# Patient Record
Sex: FEMALE | Race: WHITE
[De-identification: names, ages, dates, MRNs, and addresses within clinical notes are randomized per-mention and may not be internally consistent; named-entity substitution may affect disease eponyms.]

---

## 2020-02-16 ENCOUNTER — HOSPITAL ENCOUNTER (OUTPATIENT)
Dept: HOSPITAL 76 - DI | Age: 25
Discharge: HOME | End: 2020-02-16
Attending: OBSTETRICS & GYNECOLOGY
Payer: COMMERCIAL

## 2020-02-16 DIAGNOSIS — Z32.01: Primary | ICD-10-CM

## 2020-02-16 PROCEDURE — 76801 OB US < 14 WKS SINGLE FETUS: CPT

## 2020-02-16 NOTE — ULTRASOUND REPORT
Reason:  POSITIVE PREGNANCY TEST

Procedure Date:  02/16/2020   

Accession Number:  [id number] / [id number]                    

Procedure:  US  - OB First Trimester CPT Code:  

 

***Final Report***

 

 

FULL RESULT:

 

 

EXAM:

FIRST TRIMESTER OBSTETRIC ULTRASOUND

(Less than 11 weeks)

 

EXAM DATE: 2/16/2020 09:31 AM.

 

CLINICAL HISTORY: Positive pregnancy test.

LMP: 11/16/2019.

 

COMPARISONS: None.

 

TECHNIQUE: Transabdominal and transvaginal ultrasound examination with 

static image documentation.

 

CLINICAL DATES:

EGA 13 weeks 1 day with KONRAD 08/22/2020 based on LMP.

 

ASSESSMENT:

Gestational Sac: Single intrauterine.  Mean gestational sac diameter: 71 

mm = 13 weeks 5 days.

Embryo: CRL (crown-rump length) 73 mm = 13 weeks 3 days.

Cardiac activity: 161 beats per minute.

Yolk sac: Not visualized.

Amniotic fluid: JANAY 7.2 cm, MVP equals 3.3 cm.

Early placenta: Posterior fundal.

Other: No perigestational fluid collection demonstrated.

 

MATERNAL STRUCTURES:

Uterus: Anteverted. Unremarkable.

Cervix: Closed.

Right Ovary/Adnexa: The ovary measures 3.1 x 1.7 x 2.8 cm, volume 7.8 cc. 

No masses. Corpus luteum is present measuring 1.7 x 1.4 x 1.4 cm.

Left Ovary/Adnexa: The ovary measures 1.9 x 1.0 x 2.2 cm, volume 2.1 cc.  

Unremarkable.

Free Fluid: None.

 

Other: None.

IMPRESSION:

1. Single viable intrauterine pregnancy at EGA 13 weeks 3 days with KONRAD 

08/20/2020 based on crown-rump length, which is concordant with clinical 

dates.

2. Assigned dating is KONRAD 08/17/2020 based on current ultrasound.

 

RADIA

## 2020-02-25 ENCOUNTER — HOSPITAL ENCOUNTER (OUTPATIENT)
Dept: HOSPITAL 76 - LAB.R | Age: 25
Discharge: HOME | End: 2020-02-25
Attending: OBSTETRICS & GYNECOLOGY
Payer: COMMERCIAL

## 2020-02-25 DIAGNOSIS — Z36.89: Primary | ICD-10-CM

## 2020-02-25 LAB
AMPHET UR QL SCN: NEGATIVE
BENZODIAZ UR QL SCN: NEGATIVE
CLARITY UR REFRACT.AUTO: CLEAR
COCAINE UR-SCNC: NEGATIVE UMOL/L
GLUCOSE UR QL STRIP.AUTO: NEGATIVE MG/DL
KETONES UR QL STRIP.AUTO: NEGATIVE MG/DL
METHADONE UR QL SCN: NEGATIVE
METHAMPHET UR QL SCN: NEGATIVE
NITRITE UR QL STRIP.AUTO: NEGATIVE
OPIATES UR QL SCN: NEGATIVE
PH UR STRIP.AUTO: 6 PH (ref 5–7.5)
PROT UR STRIP.AUTO-MCNC: NEGATIVE MG/DL
RBC # UR STRIP.AUTO: NEGATIVE /UL
SP GR UR STRIP.AUTO: 1.02 (ref 1–1.03)
UROBILINOGEN UR QL STRIP.AUTO: (no result) E.U./DL
UROBILINOGEN UR STRIP.AUTO-MCNC: NEGATIVE MG/DL
VOLATILE DRUGS POS SERPL SCN: (no result)

## 2020-02-25 PROCEDURE — 87086 URINE CULTURE/COLONY COUNT: CPT

## 2020-02-25 PROCEDURE — 81001 URINALYSIS AUTO W/SCOPE: CPT

## 2020-02-25 PROCEDURE — 81003 URINALYSIS AUTO W/O SCOPE: CPT

## 2020-02-25 PROCEDURE — 80306 DRUG TEST PRSMV INSTRMNT: CPT

## 2020-03-31 ENCOUNTER — HOSPITAL ENCOUNTER (OUTPATIENT)
Dept: HOSPITAL 76 - LAB | Age: 25
Discharge: HOME | End: 2020-03-31
Attending: OBSTETRICS & GYNECOLOGY
Payer: COMMERCIAL

## 2020-03-31 ENCOUNTER — HOSPITAL ENCOUNTER (OUTPATIENT)
Dept: HOSPITAL 76 - DI | Age: 25
Discharge: HOME | End: 2020-03-31
Attending: OBSTETRICS & GYNECOLOGY
Payer: COMMERCIAL

## 2020-03-31 DIAGNOSIS — Z3A.20: ICD-10-CM

## 2020-03-31 DIAGNOSIS — Z34.80: Primary | ICD-10-CM

## 2020-03-31 DIAGNOSIS — Z36.0: ICD-10-CM

## 2020-03-31 DIAGNOSIS — Z36.89: ICD-10-CM

## 2020-03-31 DIAGNOSIS — O26.892: ICD-10-CM

## 2020-03-31 DIAGNOSIS — Z36.89: Primary | ICD-10-CM

## 2020-03-31 LAB
BASOPHILS NFR BLD AUTO: 0 10^3/UL (ref 0–0.1)
BASOPHILS NFR BLD AUTO: 0.2 %
EOSINOPHIL # BLD AUTO: 0 10^3/UL (ref 0–0.7)
EOSINOPHIL NFR BLD AUTO: 0.4 %
ERYTHROCYTE [DISTWIDTH] IN BLOOD BY AUTOMATED COUNT: 13.8 % (ref 12–15)
HGB UR QL STRIP: 10 G/DL (ref 12–16)
LYMPHOCYTES # SPEC AUTO: 1.2 10^3/UL (ref 1.5–3.5)
LYMPHOCYTES NFR BLD AUTO: 14.9 %
MCH RBC QN AUTO: 27 PG (ref 27–31)
MCHC RBC AUTO-ENTMCNC: 32.3 G/DL (ref 32–36)
MCV RBC AUTO: 83.6 FL (ref 81–99)
MONOCYTES # BLD AUTO: 0.5 10^3/UL (ref 0–1)
MONOCYTES NFR BLD AUTO: 6.6 %
NEUTROPHILS # BLD AUTO: 6.4 10^3/UL (ref 1.5–6.6)
NEUTROPHILS # SNV AUTO: 8.2 X10^3/UL (ref 4.8–10.8)
NEUTROPHILS NFR BLD AUTO: 77.5 %
PDW BLD AUTO: 10.4 FL (ref 7.9–10.8)
PLATELET # BLD: 250 10^3/UL (ref 130–450)
RBC MAR: 3.71 10^6/UL (ref 4.2–5.4)

## 2020-03-31 PROCEDURE — 81599 UNLISTED MAAA: CPT

## 2020-03-31 PROCEDURE — 86803 HEPATITIS C AB TEST: CPT

## 2020-03-31 PROCEDURE — 76811 OB US DETAILED SNGL FETUS: CPT

## 2020-03-31 PROCEDURE — 36415 COLL VENOUS BLD VENIPUNCTURE: CPT

## 2020-03-31 PROCEDURE — 86900 BLOOD TYPING SEROLOGIC ABO: CPT

## 2020-03-31 PROCEDURE — 85025 COMPLETE CBC W/AUTO DIFF WBC: CPT

## 2020-03-31 PROCEDURE — 86850 RBC ANTIBODY SCREEN: CPT

## 2020-03-31 PROCEDURE — 87389 HIV-1 AG W/HIV-1&-2 AB AG IA: CPT

## 2020-03-31 PROCEDURE — 86901 BLOOD TYPING SEROLOGIC RH(D): CPT

## 2020-03-31 PROCEDURE — 87340 HEPATITIS B SURFACE AG IA: CPT

## 2020-03-31 PROCEDURE — 86762 RUBELLA ANTIBODY: CPT

## 2020-04-01 LAB
HEPATITIS B SURFACE ANTIGEN: (no result)
HEPATITIS C ANTIBODY: (no result)
HIV AG/AB 4TH GEN: (no result)
SIGNAL TO CUT-OFF: 0 (ref ?–1)

## 2020-04-01 NOTE — ULTRASOUND REPORT
Reason:  

Procedure Date:  2020   

Accession Number:  [id number] / [id number]                    

Procedure:  US  - OB Detailed Fetal Eval CPT Code:  

 

***Final Report***

 

 

FULL RESULT:

 

 

EXAM:

COMPLETE OBSTETRICAL ULTRASOUND

 

EXAM DATE: 3/31/2020 12:53 PM.

 

CLINICAL HISTORY: Fetal anatomic survey.

 

COMPARISON: OB FIRST TRIMESTER 2020 8:37 AM.

 

TECHNIQUE: Real-time sonographic evaluation of the fetus performed by the 

sonographer. Multiple representative static images were saved for review. 

Additional transvaginal imaging to more accurately evaluate cervical 

length/placental position/etc.

 

DATING:

Established EGA 20 weeks 1 day with KONRAD 2020 based on stated dates.

EGA 19 weeks 3 days with KONRAD 2020 based on LMP.

EGA 20 weeks 4 days with KONRAD 2020 based on the current ultrasound.

 

GENERAL EVALUATION

Miranda pregnancy.

Cardiac activity: 144 bpm.

Fetal movement: Visualized.

Presentation: Variable.

Placenta: Fundal position. No evidence for previa.

Umbilical cord: 3 vessel cord. Central placental cord origin.

Amniotic fluid: Subjectively normal. MVP 4.8 cm.

 

FETAL BIOMETRY

Bi-Parietal Diameter (BPD): 5.0 cm, 21 weeks 1 day

Head Circumference (HC):   17.9 cm, 20 weeks 3 days

Abdominal Circumference (AC): 15.7 cm, 20 weeks 6 days

Femur Length (FL): 3.1 cm, 19 weeks 5 days

 

Estimated Fetal Weight: 347 g, 56.3 percentile for 20 weeks 1 day.

 

FETAL ANATOMY

The intracranial structures, profile, face/nose/lips, spine, 4 chamber 

heart and outflow tracts, stomach, abdominal wall and cord insertion, 

diaphragm, bladder, and extremities were visualized and demonstrate no 

abnormality. Fetal renal pelviectasis is noted measuring 5.4 mm on the 

right and 3.5 mm on the left.

 

MATERNAL STRUCTURES

Uterus: Unremarkable.

Cervix: Long and closed. Transabdominal length 5.9 cm.

Right ovary/adnexa: Unremarkable.

Left ovary/adnexa: Unremarkable.

Free fluid: None.

IMPRESSION:

1. Miranda live intrauterine pregnancy with gestational age 20 weeks 1 

day based on stated dates.

2. Estimated fetal weight is within expected limits for assigned dating.

3. Fetal renal pelviectasis measuring 5.4 mm on the right and 3.5 mm on 

the left. Otherwise no fetal anatomic abnormalities are detected at this 

time.

 

RADIA

## 2020-04-11 ENCOUNTER — HOSPITAL ENCOUNTER (OUTPATIENT)
Dept: HOSPITAL 76 - WFO | Age: 25
Discharge: HOME | End: 2020-04-11
Attending: OBSTETRICS & GYNECOLOGY
Payer: COMMERCIAL

## 2020-04-11 VITALS — DIASTOLIC BLOOD PRESSURE: 74 MMHG | SYSTOLIC BLOOD PRESSURE: 117 MMHG

## 2020-04-11 DIAGNOSIS — Z3A.21: ICD-10-CM

## 2020-04-11 DIAGNOSIS — Z67.21: ICD-10-CM

## 2020-04-11 DIAGNOSIS — O26.892: ICD-10-CM

## 2020-04-11 DIAGNOSIS — N94.89: ICD-10-CM

## 2020-04-11 DIAGNOSIS — O99.89: Primary | ICD-10-CM

## 2020-04-11 LAB
CLARITY UR REFRACT.AUTO: CLEAR
GLUCOSE UR QL STRIP.AUTO: NEGATIVE MG/DL
IGF BP1 VAG QL: NEGATIVE
KETONES UR QL STRIP.AUTO: NEGATIVE MG/DL
NITRITE UR QL STRIP.AUTO: NEGATIVE
PH UR STRIP.AUTO: 7 PH (ref 5–7.5)
PROT UR STRIP.AUTO-MCNC: NEGATIVE MG/DL
RBC # UR STRIP.AUTO: NEGATIVE /UL
SP GR UR STRIP.AUTO: <=1.005 (ref 1–1.03)
SQUAMOUS URNS QL MICRO: (no result)
UROBILINOGEN UR QL STRIP.AUTO: (no result) E.U./DL
UROBILINOGEN UR STRIP.AUTO-MCNC: NEGATIVE MG/DL

## 2020-04-11 PROCEDURE — 84112 EVAL AMNIOTIC FLUID PROTEIN: CPT

## 2020-04-11 PROCEDURE — 81001 URINALYSIS AUTO W/SCOPE: CPT

## 2020-04-11 PROCEDURE — 87086 URINE CULTURE/COLONY COUNT: CPT

## 2020-04-11 PROCEDURE — 76815 OB US LIMITED FETUS(S): CPT

## 2020-04-11 PROCEDURE — 99214 OFFICE O/P EST MOD 30 MIN: CPT

## 2020-04-11 PROCEDURE — 96372 THER/PROPH/DIAG INJ SC/IM: CPT

## 2020-04-11 NOTE — ULTRASOUND REPORT
Reason:  abdominal cramping, Rh negative

Procedure Date:  04/11/2020   

Accession Number:  [id number] / [id number]                    

Procedure:  US  - OB Limited CPT Code:  

 

***Addended Final Report***

 

 

FULL RESULT:

 

 

EXAM:

LIMITED OBSTETRICAL ULTRASOUND

 

EXAM DATE: 4/11/2020 02:54 PM.

 

CLINICAL HISTORY: Abdominal cramping, Rh negative.

 

COMPARISON: None.

 

TECHNIQUE: Limited Real-time sonographic evaluation of the fetus 

performed by the sonographer. Multiple representative static images were 

saved for review.

 

DATING:

Established EGA 21 weeks 0 days with KONRAD 08/22/2020.

 

GENERAL EVALUATION

Miranda pregnancy.

Cardiac activity: 144 bpm.

Fetal movement: Visualized.

Presentation: Variable. Facing maternal left.

Placenta: Fundal position.

Amniotic fluid: JANAY 16.6 cm. MVP 4.3 cm.

 

 

MATERNAL STRUCTURES

Maternal cervix long and closed measuring 4.7 cm in length.

 

IMPRESSION:

1. Miranda live intrauterine pregnancy with gestational age 21 weeks 0 

days based on prior ultrasounds..

2. Maternal cervix closed measuring 4.7 cm in length.

3. No placental previa or placental abruption.

4. Fetal heart rate of 144

 

RADIA

The call report notification system was initiated by Dr. Collins Del Rosario at 

05:23 PM on 4/11/2020.

ADDENDUM: 04/11/20 17:35

 

The above call report findings  were discussed with Dr. Russo by Dr. Collins Del Rosario at 05:35 PM on 4/11/2020.

## 2020-04-11 NOTE — PREOP HISTORY & PHYSICAL
DATE OF SERVICE: 2020

Physician: Facundo Russo MD

 

IDENTIFICATION:  The patient is a 25-year-old G2, P1 female who is 21 weeks determined by LMP as well
 as early ultrasound.

 

CHIEF COMPLAINT:  Uterine cramping.

 

HISTORY OF PRESENT ILLNESS:  The patient was awakened this morning with cramping.  This comes and goe
s.  She states that it goes to a maximum of 8 to 9/10.  She has a baseline of 3 to 4/10.  She denies 
any bleeding accompanying this.  She denies any history of trauma.  She states it is improved with si
tting but worse with standing and walking around.  She denies any recent sex or any other potential a
ggravating factors.  She currently chases a 9-year-old around the apartment.

 

PAST MEDICAL HISTORY:  Positive for rheumatoid arthritis.

 

PAST SURGICAL HISTORY:  Positive for dental surgery.

 

ALLERGIES:  

1.  PENICILLIN.

2.  BENADRYL.

 

CURRENT MEDICATIONS

1.  Prenatal vitamins.

2.  Zofran.

 

HABITS/SOCIAL: The patient denies the use of alcohol, tobacco, street or addictive drugs or tetrahydr
ocannabinol. The patient is  to an [employer] person.

 

PHYSICAL EXAMINATION:  

GENERAL:  Well-developed, well-nourished white female.  She is in no acute distress at this time.

VITAL SIGNS:  Temperature is 37, heart rate 82, blood pressure 117/84, respirations 18, saturations 1
00% on room air.

HEENT:  The left pupil was roughly 1 mm larger than the right pupil. Extraocular muscles are intact.

CARDIOVASCULAR:  Heart is regular rate and rhythm without murmurs.

LUNGS:  Lung fields are clear without rales or wheezes.

ABDOMEN:  Gravid, roughly 28 cm fundal height.  

GENITOURINARY:  The uterus is minimally tender at this time.  Speculum examination shows no evidence 
of fluid from the vagina.  A ROM-Plus was taken. The cervix was noted to be closed. Presenting part o
f the infant is high in the pelvis.  She has previously had an ultrasound, which is unremarkable, con
current with her dates.  Placenta was noted to be not previa at that time.  However, the exact locati
on was not mentioned.

 

LABORATORY DATA:  Urinalysis performed, which was negative for nitrites, leukocytes, squamous epithel
ial cells.  WBCs were 0-3.  The patient's lab shows her to be B-negative.

 

IMPRESSION:  A 26-year-old  2, para 1 female at 21 weeks with uterine cramping of unknown etio
logy.  She is Rh-negative.

 

PLAN:  At this point, we will obtain ultrasound looking for evidence of any retroplacental bleeding a
t this time. We will also administer RhoGAM at this time for the possibility of undiagnosed uterine b
leed.

 

 

DD: 2020 14:27

TD: 2020 14:29

Job #: [id number]

## 2020-05-11 ENCOUNTER — HOSPITAL ENCOUNTER (OUTPATIENT)
Dept: HOSPITAL 76 - LAB.WCP | Age: 25
Discharge: HOME | End: 2020-05-11
Attending: OBSTETRICS & GYNECOLOGY
Payer: COMMERCIAL

## 2020-05-11 DIAGNOSIS — R32: ICD-10-CM

## 2020-05-11 DIAGNOSIS — O09.899: Primary | ICD-10-CM

## 2020-05-11 DIAGNOSIS — Z3A.00: ICD-10-CM

## 2020-05-11 PROCEDURE — 87086 URINE CULTURE/COLONY COUNT: CPT

## 2020-05-26 ENCOUNTER — HOSPITAL ENCOUNTER (OUTPATIENT)
Dept: HOSPITAL 76 - LAB | Age: 25
Discharge: HOME | End: 2020-05-26
Attending: OBSTETRICS & GYNECOLOGY
Payer: COMMERCIAL

## 2020-05-26 ENCOUNTER — HOSPITAL ENCOUNTER (OUTPATIENT)
Dept: HOSPITAL 76 - DI | Age: 25
Discharge: HOME | End: 2020-05-26
Attending: OBSTETRICS & GYNECOLOGY
Payer: COMMERCIAL

## 2020-05-26 DIAGNOSIS — Z67.91: ICD-10-CM

## 2020-05-26 DIAGNOSIS — O99.019: Primary | ICD-10-CM

## 2020-05-26 LAB
ERYTHROCYTE [DISTWIDTH] IN BLOOD BY AUTOMATED COUNT: 14.1 % (ref 12–15)
HGB UR QL STRIP: 9.1 G/DL (ref 12–16)
MCH RBC QN AUTO: 24.7 PG (ref 27–31)
MCHC RBC AUTO-ENTMCNC: 30.6 G/DL (ref 32–36)
MCV RBC AUTO: 80.7 FL (ref 81–99)
NEUTROPHILS # SNV AUTO: 8.2 X10^3/UL (ref 4.8–10.8)
PDW BLD AUTO: 10.5 FL (ref 7.9–10.8)
PLATELET # BLD: 223 10^3/UL (ref 130–450)
RBC MAR: 3.68 10^6/UL (ref 4.2–5.4)

## 2020-05-26 PROCEDURE — 82950 GLUCOSE TEST: CPT

## 2020-05-26 PROCEDURE — 85027 COMPLETE CBC AUTOMATED: CPT

## 2020-05-26 PROCEDURE — 86850 RBC ANTIBODY SCREEN: CPT

## 2020-05-26 PROCEDURE — 86870 RBC ANTIBODY IDENTIFICATION: CPT

## 2020-05-26 PROCEDURE — 76816 OB US FOLLOW-UP PER FETUS: CPT

## 2020-05-26 PROCEDURE — 36415 COLL VENOUS BLD VENIPUNCTURE: CPT

## 2020-05-27 NOTE — ULTRASOUND REPORT
Reason:  FETAL ABNORMALITY IN ANTEPARTUM PREG

Procedure Date:  05/26/2020   

Accession Number:  [id number] / [id number]                    

Procedure:  US  - OB F/U or Repeat CPT Code:  

 

***Final Report***

 

 

FULL RESULT:

 

 

EXAM:

LIMITED OBSTETRICAL ULTRASOUND

 

EXAM DATE: 5/26/2020 08:58 AM.

 

CLINICAL HISTORY: FETAL ABNORMALITY IN ANTEPARTUM PREG. Follow-up fetal 

renal pelviectasis

 

COMPARISON: 03/31/2020.

 

TECHNIQUE: Real-time sonographic evaluation of the fetus performed by the 

sonographer. Multiple representative static images were saved for review. 

Additional transvaginal imaging to more accurately evaluate cervical 

length/placental position/etc.

 

DATING:

Established EGA 28 weeks 1 day with KONRAD 08/17/2020. Provider stated

 

GENERAL EVALUATION

Miranda pregnancy.

Cardiac activity: 143 bpm.

Fetal movement: Visualized.

Presentation: Transverse head maternal right

Placenta: Bundle position.

Amniotic fluid: Normal. JANAY 16.4 cm. MVP 6.2 cm.

 

FETAL ANATOMY

Fetal renal pelvis right 0.34, left 0.45 cm. These are within normal 

limits for 28 weeks 1 day

 

 

IMPRESSION:

1. Miranda live intrauterine pregnancy with gestational age 28 weeks 1 

day based on established KONRAD of 08/17/2020.

2. Fetal renal pelvis are now within normal limits.

 

RADIA

## 2020-06-08 ENCOUNTER — HOSPITAL ENCOUNTER (OUTPATIENT)
Dept: HOSPITAL 76 - WFO | Age: 25
Discharge: HOME | End: 2020-06-08
Attending: OBSTETRICS & GYNECOLOGY
Payer: COMMERCIAL

## 2020-06-08 VITALS — SYSTOLIC BLOOD PRESSURE: 113 MMHG | DIASTOLIC BLOOD PRESSURE: 67 MMHG

## 2020-06-08 DIAGNOSIS — D64.9: ICD-10-CM

## 2020-06-08 DIAGNOSIS — O99.013: Primary | ICD-10-CM

## 2020-06-08 DIAGNOSIS — Z3A.30: ICD-10-CM

## 2020-06-08 PROCEDURE — 99213 OFFICE O/P EST LOW 20 MIN: CPT

## 2020-06-08 PROCEDURE — 96365 THER/PROPH/DIAG IV INF INIT: CPT

## 2020-06-22 ENCOUNTER — HOSPITAL ENCOUNTER (OUTPATIENT)
Dept: HOSPITAL 76 - WFO | Age: 25
Discharge: HOME | End: 2020-06-22
Attending: OBSTETRICS & GYNECOLOGY
Payer: COMMERCIAL

## 2020-06-22 VITALS — DIASTOLIC BLOOD PRESSURE: 74 MMHG | SYSTOLIC BLOOD PRESSURE: 111 MMHG

## 2020-06-22 DIAGNOSIS — O26.893: Primary | ICD-10-CM

## 2020-06-22 DIAGNOSIS — Z3A.32: ICD-10-CM

## 2020-06-22 LAB
AMPHET UR QL SCN: NEGATIVE
BENZODIAZ UR QL SCN: NEGATIVE
CLARITY UR REFRACT.AUTO: CLEAR
COCAINE UR-SCNC: NEGATIVE UMOL/L
GLUCOSE UR QL STRIP.AUTO: NEGATIVE MG/DL
KETONES UR QL STRIP.AUTO: NEGATIVE MG/DL
METHADONE UR QL SCN: NEGATIVE
METHAMPHET UR QL SCN: NEGATIVE
NITRITE UR QL STRIP.AUTO: NEGATIVE
OPIATES UR QL SCN: NEGATIVE
PH UR STRIP.AUTO: 7 PH (ref 5–7.5)
PROT UR STRIP.AUTO-MCNC: NEGATIVE MG/DL
RBC # UR STRIP.AUTO: NEGATIVE /UL
SP GR UR STRIP.AUTO: 1.02 (ref 1–1.03)
T VAGINALIS RRNA GENITAL QL PROBE: NEGATIVE
UROBILINOGEN UR QL STRIP.AUTO: (no result) E.U./DL
UROBILINOGEN UR STRIP.AUTO-MCNC: NEGATIVE MG/DL
VOLATILE DRUGS POS SERPL SCN: (no result)

## 2020-06-22 PROCEDURE — 87081 CULTURE SCREEN ONLY: CPT

## 2020-06-22 PROCEDURE — 87661 TRICHOMONAS VAGINALIS AMPLIF: CPT

## 2020-06-22 PROCEDURE — 87591 N.GONORRHOEAE DNA AMP PROB: CPT

## 2020-06-22 PROCEDURE — 87086 URINE CULTURE/COLONY COUNT: CPT

## 2020-06-22 PROCEDURE — 76816 OB US FOLLOW-UP PER FETUS: CPT

## 2020-06-22 PROCEDURE — 99213 OFFICE O/P EST LOW 20 MIN: CPT

## 2020-06-22 PROCEDURE — 81003 URINALYSIS AUTO W/O SCOPE: CPT

## 2020-06-22 PROCEDURE — 81001 URINALYSIS AUTO W/SCOPE: CPT

## 2020-06-22 PROCEDURE — 87491 CHLMYD TRACH DNA AMP PROBE: CPT

## 2020-06-22 PROCEDURE — 80306 DRUG TEST PRSMV INSTRMNT: CPT

## 2020-06-22 PROCEDURE — 76817 TRANSVAGINAL US OBSTETRIC: CPT

## 2020-06-22 NOTE — ULTRASOUND REPORT
PROCEDURE:  OB Transvaginal

 

INDICATIONS:  vaginal bleeding

 

OUTSIDE/PRIOR DATING DATA:  

Last menstrual period (LMP): 11/16/2019.  

LMP-based estimated date of delivery (KONRAD): 8/22/2020.  

First dating scan (date and location): 2/16/2020.  

Estimated date of delivery (KONRAD) from first dating scan: 8/17/2020.  

 

TECHNIQUE:  Real-time scanning was performed of the fetus, with image documentation and biometric skip
surements.  Biophysical profile was also obtained.  

Endovaginal scanning:  yes

 

COMPARISON:  OB ultrasound 5/26/2020, 4/11/2020, 3/21/2020.

 

FINDINGS:  

 

General:  A single living intrauterine gestation is present.  

Presentation:  Transverse

Placenta:  Placental position is fundal, without previa.  

Amniotic fluid index:  15.0 cm, within normal limits for gestational age.  Largest pocket 4.8 cm

Fetal heart rate:  124 beats per minute. 

Maternal cervical canal:  4.9 cm long; normal length is 2.5 cm or more.  

 

Fetal biometrics:  

Biparietal diameter:  8.5 cm 34 weeks 2 days

Head circumference:  31.1 cm 34 weeks 6 days

Abdominal circumference:  29.5 cm 33 weeks 3 days

Femur length:  6.0 cm 31 weeks 2 days

Estimated gestational age from initial scan: 32 weeks 0 days

Composite gestational age from present scan:  33 weeks 3 days

Estimated fetal weight and percentile: 2107 g 73rd percentile

Measurement variability in biometric dating: +/- 10 days from 12-20 weeks gestation, +/- 2 weeks from
 20-30 weeks gestation, +/- 3 weeks at 30 weeks gestation or later.  

 

IMPRESSION:  

 

1. Single live intrauterine pregnancy with ultrasound gestational age 33 weeks 3 days compared to 32 
weeks 0 days. Ultrasound KONRAD is unchanged at 8/17/2020.

 

 

2. No acute abnormality.

 

Reviewed by: Karo Diaz MD on 6/22/2020 1:25 PM PDT

Approved by: Karo Diaz MD on 6/22/2020 1:25 PM PDT

 

 

Station ID:  [id number]

## 2020-06-22 NOTE — ULTRASOUND REPORT
PROCEDURE:  OB F/U or Repeat

 

INDICATIONS:  vaginal bleeding

 

OUTSIDE/PRIOR DATING DATA:  

Last menstrual period (LMP): 11/16/2019.  

LMP-based estimated date of delivery (KONRAD): 8/22/2020.  

First dating scan (date and location): 2/16/2020.  

Estimated date of delivery (KONRAD) from first dating scan: 8/17/2020.  

 

TECHNIQUE: 

Real-time scanning was performed of the fetus, with image documentation and biometric measurements.  


Endovaginal scanning:  Performed to evaluate cervical length.

 

COMPARISON:  5/26/2020

 

FINDINGS:  

 

General:  A single living intrauterine gestation is present.  

Presentation:  Transverse, head to the maternal right

Placenta:  Placental position is fundal, without previa.  

Amniotic fluid index:  15.0 cm, normal for gestational age.  Largest pocket is 4.8 cm

Fetal heart rate:  124 beats per minute.  

Maternal cervical canal:  4.9 cm long; normal length is 2.5 cm or more.  By transvaginal imaging, the
 cervix is closed.

 

Fetal biometrics:  

Biparietal diameter:  8.2 cm, 34 weeks, 2 days

Head circumference:  31.1 cm, 34 weeks, 6 days

Abdominal circumference:  29.5 cm, 33 weeks, 3 days

Femur length:  6.0 cm, 31 weeks, 2 days

Estimated gestational age from initial scan: 32 weeks, 0 days.  

Composite gestational age from present scan:  33 weeks, 3 days

Estimated fetal weight and percentile: 2107 g, 73rd percentile

Measurement variability in biometric dating: +/- 10 days from 12-20 weeks gestation, +/- 2 weeks from
 20-30 weeks gestation, +/- 3 weeks at 30 weeks gestation or more.  

 

IMPRESSION:  

 

1. Single live intrauterine pregnancy in transverse presentation.

2. Closed cervix measuring 4.9 cm in length.

3. Composite gestational age by today's study is 10 days ahead of the initially assigned gestational 
age.

 

Reviewed by: Michelle Crabtree MD on 6/22/2020 12:51 PM PDT

Approved by: Michelle Crabtree MD on 6/22/2020 12:51 PM PDT

 

 

Station ID:  [id number]

## 2020-06-24 NOTE — PROVIDER PROGRESS NOTE
- HPI


Chief Complaint: Other (Patientis 26 yo  at 32+2 wga reporting light VB.   

No recent IC. No CTX or LOF.)


Current Pregnancy: 


                                                               Current Pregnancy





EDU                              08/15/20


Gestation                        32 Weeks and 2 Days


                          2


Para                             1





Vital Signs











Temperature  98.4 F   20 11:18


 


Heart Rate  92   20 11:18


 


Respiratory Rate  16   20 11:18


 


Blood Pressure  115/74   20 11:18


 


O2 Saturation  100   20 11:18




















Temperature  98.4 F   20 11:18


 


Heart Rate  84   20 12:39


 


Respiratory Rate  18   20 12:39


 


Blood Pressure  111/74   20 12:39


 


O2 Saturation  100   20 12:39














- Procedures


OB Procedure Performed: NST


NST Procedure: 


 mod pam 15x15 accels no decels


TOCO: rare (<2/hr)


Service Date of procedure: 20


Procedure Details: 





CAt I tracing


Formal US showed TVCL 4.9 cm


Fundal placenta, no previa


Findings: 





Per RN exam: 


SSE: cervic long and visually closed, no blood in vaginal vault





TVCL 4.9 cm; no evidence of previa


FFN neg


GCCT and vaginitis panelneg


UA neg





TOCO with rarem intermittent ctx (1-2 per 30-60 minutes)














- Plan


Plan: 





FWB: 





CAT I tracing


EFW 73%ile


JANAY wnl








VB: 


CL reassuring, especially with later gestational age


FFN neg


-Low concern for  labor





-No evidence of previa on us


-No vaginitis or GCCT





Not actively bleeding





Warning sings reviewed


DC to home

## 2020-06-24 NOTE — PROVIDER PROGRESS NOTE
- HPI


Chief Complaint: Other (iron infusion)


Current Pregnancy: 


Vital Signs











Temperature  99.5 F   20 14:24


 


Heart Rate  84   20 14:24


 


Respiratory Rate  16   20 14:24


 


Blood Pressure  95/62   20 14:24


 


O2 Saturation  100   20 14:24




















Temperature  99.5 F   20 14:24


 


Heart Rate  80   20 15:50


 


Respiratory Rate  16   20 15:50


 


Blood Pressure  113/67   20 15:50


 


O2 Saturation  100   20 15:50

















Patient is a 26 yo  at 30+2 wga with HCT 29.7; failing oral supplementation


Here for IV iron infusion


No concerns.


+FM. Denies LOF/VB/CTX





- Procedures


NST Procedure: 


none


Service Date of procedure: 20


Procedure Details: 





Iron infusion given 





- Plan


Plan: 





26 yo  at 30+2 wga here for iron infusion for anemia affecting pregnancy


Iron infusion (Iron dextran, dose adjusted by pharmacy)  was administered 

without incident


Well tolerated





Patient to follow-up as per routine OB care

## 2020-07-29 ENCOUNTER — HOSPITAL ENCOUNTER (OUTPATIENT)
Dept: HOSPITAL 76 - LAB | Age: 25
Discharge: HOME | End: 2020-07-29
Attending: OBSTETRICS & GYNECOLOGY
Payer: COMMERCIAL

## 2020-07-29 DIAGNOSIS — O99.019: Primary | ICD-10-CM

## 2020-07-29 LAB
BASOPHILS NFR BLD AUTO: 0 10^3/UL (ref 0–0.1)
BASOPHILS NFR BLD AUTO: 0.3 %
EOSINOPHIL # BLD AUTO: 0 10^3/UL (ref 0–0.7)
EOSINOPHIL NFR BLD AUTO: 0.3 %
ERYTHROCYTE [DISTWIDTH] IN BLOOD BY AUTOMATED COUNT: 22.3 % (ref 12–15)
HGB UR QL STRIP: 12.6 G/DL (ref 12–16)
LYMPHOCYTES # SPEC AUTO: 1.4 10^3/UL (ref 1.5–3.5)
LYMPHOCYTES NFR BLD AUTO: 15.8 %
MCH RBC QN AUTO: 30.1 PG (ref 27–31)
MCHC RBC AUTO-ENTMCNC: 33.9 G/DL (ref 32–36)
MCV RBC AUTO: 88.8 FL (ref 81–99)
MONOCYTES # BLD AUTO: 0.8 10^3/UL (ref 0–1)
MONOCYTES NFR BLD AUTO: 8.6 %
NEUTROPHILS # BLD AUTO: 6.7 10^3/UL (ref 1.5–6.6)
NEUTROPHILS # SNV AUTO: 9 X10^3/UL (ref 4.8–10.8)
NEUTROPHILS NFR BLD AUTO: 74.6 %
PDW BLD AUTO: 10.4 FL (ref 7.9–10.8)
PLATELET # BLD: 206 10^3/UL (ref 130–450)
RBC MAR: 4.19 10^6/UL (ref 4.2–5.4)
RBC MORPH BLD: (no result)

## 2020-07-29 PROCEDURE — 85025 COMPLETE CBC W/AUTO DIFF WBC: CPT

## 2020-07-29 PROCEDURE — 36415 COLL VENOUS BLD VENIPUNCTURE: CPT

## 2020-08-04 ENCOUNTER — HOSPITAL ENCOUNTER (OUTPATIENT)
Dept: HOSPITAL 76 - WFO | Age: 25
Discharge: HOME | End: 2020-08-04
Attending: OBSTETRICS & GYNECOLOGY
Payer: COMMERCIAL

## 2020-08-04 VITALS — SYSTOLIC BLOOD PRESSURE: 119 MMHG | DIASTOLIC BLOOD PRESSURE: 78 MMHG

## 2020-08-04 DIAGNOSIS — Z34.83: Primary | ICD-10-CM

## 2020-08-04 DIAGNOSIS — Z3A.37: ICD-10-CM

## 2020-08-04 PROCEDURE — 99213 OFFICE O/P EST LOW 20 MIN: CPT

## 2020-08-04 NOTE — PROVIDER PROGRESS NOTE
- HPI


Chief Complaint: Labor Check


Current Pregnancy: 


                                                               Current Pregnancy





EDU                              20


Gestation                        37 Weeks and 3 Days


                          2


Para                             1





Vital Signs











Temperature  97.9 F   20 14:35


 


Heart Rate  107 H  20 14:35


 


Respiratory Rate  20   20 14:35


 


Blood Pressure  116/82 H  20 14:35


 


O2 Saturation  99   20 14:35




















Temperature  97.9 F   20 14:35


 


Heart Rate  89   20 17:11


 


Respiratory Rate  18   20 17:11


 


Blood Pressure  119/78   20 17:11


 


O2 Saturation  99   20 14:35














- Exam





SVE /-3


Unchanged over serial exams per RN





- Procedures


OB Procedure Performed: NST


NST Procedure: 


 mod pam 15x15 accels, isolated decel with > 30 min without decels


TOCO: intermittent


Service Date of procedure: 20


Procedure Details: 





Patient is a 26 yo  at 37+3 wga here for labor assessment





No change in SVE over several hours exam


Cat I tracing


Warning signs reviewed


DC to home

## 2020-08-07 ENCOUNTER — HOSPITAL ENCOUNTER (OUTPATIENT)
Dept: HOSPITAL 76 - WFO | Age: 25
Discharge: HOME | End: 2020-08-07
Attending: OBSTETRICS & GYNECOLOGY
Payer: COMMERCIAL

## 2020-08-07 VITALS — DIASTOLIC BLOOD PRESSURE: 74 MMHG | SYSTOLIC BLOOD PRESSURE: 108 MMHG

## 2020-08-07 DIAGNOSIS — Z3A.37: ICD-10-CM

## 2020-08-07 DIAGNOSIS — O36.8130: Primary | ICD-10-CM

## 2020-08-07 PROCEDURE — 59025 FETAL NON-STRESS TEST: CPT

## 2020-08-07 NOTE — PROVIDER PROGRESS NOTE
- HPI


Chief Complaint: Decreased fetal movement


Current Pregnancy: 


                                                               Current Pregnancy





EDU                              20


Gestation                        37 Weeks and 6 Days


                          2


Para                             1





Vital Signs











Temperature  98.4 F   20 09:31


 


Heart Rate  81   20 09:31


 


Respiratory Rate  18   20 09:31


 


Blood Pressure  108/74   20 09:31




















Temperature  98.4 F   20 09:31


 


Heart Rate  81   20 09:31


 


Respiratory Rate  18   20 09:31


 


Blood Pressure  108/74   20 09:31


 


O2 Saturation      














- Exam





 mod pam 15x15 accels no decels


TOCO: irritable





- Procedures


NST Procedure: 


NST Procedure





Start Date                       20


Start Time                       09:25


Stop Time                        10:10


Vibroacoustic Stimulation Used   No


Patient States Fetal Movement    Yes: decreased from normal pattern








Service Date of procedure: 20


Procedure Details: 





Cat I tracing


Warning signs reviewed 


Discharged to home

## 2020-08-12 ENCOUNTER — HOSPITAL ENCOUNTER (OUTPATIENT)
Dept: HOSPITAL 76 - WFO | Age: 25
Discharge: HOME | End: 2020-08-12
Attending: OBSTETRICS & GYNECOLOGY
Payer: COMMERCIAL

## 2020-08-12 VITALS — SYSTOLIC BLOOD PRESSURE: 115 MMHG | DIASTOLIC BLOOD PRESSURE: 80 MMHG

## 2020-08-12 DIAGNOSIS — Z34.83: Primary | ICD-10-CM

## 2020-08-12 PROCEDURE — 99213 OFFICE O/P EST LOW 20 MIN: CPT

## 2020-08-12 NOTE — PROVIDER PROGRESS NOTE
- HPI


Chief Complaint: Labor Check


Current Pregnancy: 


                                                               Current Pregnancy





EDU                              20


Gestation                        38 Weeks and 4 Days


                          2


Para                             1





Vital Signs











Temperature  37.1 C   20 14:31


 


Heart Rate  71   20 14:31


 


Respiratory Rate  18   20 14:31


 


Blood Pressure  115/80   20 14:31


 


O2 Saturation  100   20 14:31




















Temperature  37.1 C   20 14:31


 


Heart Rate  71   20 14:31


 


Respiratory Rate  18   20 14:31


 


Blood Pressure  115/80   20 14:31


 


O2 Saturation  100   20 14:31














- Procedures


OB Procedure Performed: NST


NST Procedure: 


NST Procedure





Start Time                       09:25


Stop Time                        10:10








Findings: 


reactive NST





cervix is not changed.





labor precautions








- Plan


Plan: 


return for labor, SROM, decreased FM.


Keep clinic appt

## 2020-09-29 ENCOUNTER — HOSPITAL ENCOUNTER (OUTPATIENT)
Dept: HOSPITAL 76 - LAB.R | Age: 25
Discharge: HOME | End: 2020-09-29
Attending: ADVANCED PRACTICE MIDWIFE
Payer: COMMERCIAL

## 2020-09-29 DIAGNOSIS — N89.8: ICD-10-CM

## 2020-09-29 LAB
C KRUSEI DNA VAG QL NAA+PROBE: NEGATIVE
CANDIDA DNA VAG QL NAA+PROBE: NEGATIVE
T VAGINALIS RRNA GENITAL QL PROBE: NEGATIVE

## 2020-09-29 PROCEDURE — 87801 DETECT AGNT MULT DNA AMPLI: CPT

## 2020-09-29 PROCEDURE — 87661 TRICHOMONAS VAGINALIS AMPLIF: CPT

## 2020-10-27 ENCOUNTER — HOSPITAL ENCOUNTER (OUTPATIENT)
Dept: HOSPITAL 76 - LAB.R | Age: 25
Discharge: HOME | End: 2020-10-27
Attending: ADVANCED PRACTICE MIDWIFE
Payer: COMMERCIAL

## 2020-10-27 DIAGNOSIS — N76.0: Primary | ICD-10-CM

## 2020-10-27 PROCEDURE — 87801 DETECT AGNT MULT DNA AMPLI: CPT

## 2020-10-27 PROCEDURE — 87661 TRICHOMONAS VAGINALIS AMPLIF: CPT

## 2022-03-17 ENCOUNTER — HOSPITAL ENCOUNTER (OUTPATIENT)
Dept: HOSPITAL 76 - LAB.N | Age: 27
Discharge: HOME | End: 2022-03-17
Attending: NURSE PRACTITIONER
Payer: COMMERCIAL

## 2022-03-17 DIAGNOSIS — O21.9: Primary | ICD-10-CM

## 2022-03-17 LAB
ALBUMIN DIAFP-MCNC: 4.4 G/DL (ref 3.2–5.5)
ALBUMIN/GLOB SERPL: 1.3 {RATIO} (ref 1–2.2)
ALP SERPL-CCNC: 53 IU/L (ref 42–121)
ALT SERPL W P-5'-P-CCNC: 16 IU/L (ref 10–60)
ANION GAP SERPL CALCULATED.4IONS-SCNC: 10 MMOL/L (ref 6–13)
AST SERPL W P-5'-P-CCNC: 15 IU/L (ref 10–42)
BASOPHILS NFR BLD AUTO: 0 10^3/UL (ref 0–0.1)
BASOPHILS NFR BLD AUTO: 0.1 %
BILIRUB BLD-MCNC: 0.5 MG/DL (ref 0.2–1)
BUN SERPL-MCNC: 13 MG/DL (ref 6–20)
CALCIUM UR-MCNC: 9.2 MG/DL (ref 8.5–10.3)
CHLORIDE SERPL-SCNC: 100 MMOL/L (ref 101–111)
CO2 SERPL-SCNC: 25 MMOL/L (ref 21–32)
CREAT SERPLBLD-SCNC: 0.7 MG/DL (ref 0.4–1)
EOSINOPHIL # BLD AUTO: 0.1 10^3/UL (ref 0–0.7)
EOSINOPHIL NFR BLD AUTO: 1 %
ERYTHROCYTE [DISTWIDTH] IN BLOOD BY AUTOMATED COUNT: 12.6 % (ref 12–15)
GFRSERPLBLD MDRD-ARVRAT: 100 ML/MIN/{1.73_M2} (ref 89–?)
GLOBULIN SER-MCNC: 3.4 G/DL (ref 2.1–4.2)
GLUCOSE SERPL-MCNC: 86 MG/DL (ref 70–100)
HCT VFR BLD AUTO: 40.8 % (ref 37–47)
HGB UR QL STRIP: 13.5 G/DL (ref 12–16)
LYMPHOCYTES # SPEC AUTO: 1.4 10^3/UL (ref 1.5–3.5)
LYMPHOCYTES NFR BLD AUTO: 19.2 %
MCH RBC QN AUTO: 29.4 PG (ref 27–31)
MCHC RBC AUTO-ENTMCNC: 33.1 G/DL (ref 32–36)
MCV RBC AUTO: 88.9 FL (ref 81–99)
MONOCYTES # BLD AUTO: 0.5 10^3/UL (ref 0–1)
MONOCYTES NFR BLD AUTO: 6.6 %
NEUTROPHILS # BLD AUTO: 5.3 10^3/UL (ref 1.5–6.6)
NEUTROPHILS # SNV AUTO: 7.3 X10^3/UL (ref 4.8–10.8)
NEUTROPHILS NFR BLD AUTO: 72.8 %
NRBC # BLD AUTO: 0 /100WBC
NRBC # BLD AUTO: 0 X10^3/UL
PDW BLD AUTO: 11.4 FL (ref 7.9–10.8)
PLATELET # BLD: 220 10^3/UL (ref 130–450)
POTASSIUM SERPL-SCNC: 4.1 MMOL/L (ref 3.5–5)
PROT SPEC-MCNC: 7.8 G/DL (ref 6.7–8.2)
RBC MAR: 4.59 10^6/UL (ref 4.2–5.4)
SODIUM SERPLBLD-SCNC: 135 MMOL/L (ref 135–145)
TSH SERPL-ACNC: 1.44 UIU/ML (ref 0.34–5.6)

## 2022-03-17 PROCEDURE — 36415 COLL VENOUS BLD VENIPUNCTURE: CPT

## 2022-03-17 PROCEDURE — 80050 GENERAL HEALTH PANEL: CPT

## 2022-03-17 PROCEDURE — 84702 CHORIONIC GONADOTROPIN TEST: CPT
